# Patient Record
Sex: FEMALE | Race: WHITE | NOT HISPANIC OR LATINO | Employment: STUDENT | URBAN - METROPOLITAN AREA
[De-identification: names, ages, dates, MRNs, and addresses within clinical notes are randomized per-mention and may not be internally consistent; named-entity substitution may affect disease eponyms.]

---

## 2021-10-15 ENCOUNTER — APPOINTMENT (OUTPATIENT)
Dept: LAB | Facility: MEDICAL CENTER | Age: 21
End: 2021-10-15
Payer: COMMERCIAL

## 2021-10-15 DIAGNOSIS — E55.9 VITAMIN D DEFICIENCY: ICD-10-CM

## 2021-10-15 DIAGNOSIS — Z79.899 ENCOUNTER FOR LONG-TERM (CURRENT) USE OF OTHER MEDICATIONS: ICD-10-CM

## 2021-10-15 DIAGNOSIS — M08.80 JIA (JUVENILE IDIOPATHIC ARTHRITIS) (HCC): ICD-10-CM

## 2021-10-15 LAB
25(OH)D3 SERPL-MCNC: 11.8 NG/ML (ref 30–100)
ALBUMIN SERPL BCP-MCNC: 3.6 G/DL (ref 3.5–5)
ALP SERPL-CCNC: 58 U/L (ref 46–116)
ALT SERPL W P-5'-P-CCNC: 19 U/L (ref 12–78)
ANION GAP SERPL CALCULATED.3IONS-SCNC: 2 MMOL/L (ref 4–13)
AST SERPL W P-5'-P-CCNC: 11 U/L (ref 5–45)
BASOPHILS # BLD AUTO: 0.07 THOUSANDS/ΜL (ref 0–0.1)
BASOPHILS NFR BLD AUTO: 1 % (ref 0–1)
BILIRUB SERPL-MCNC: 0.99 MG/DL (ref 0.2–1)
BUN SERPL-MCNC: 15 MG/DL (ref 5–25)
CALCIUM SERPL-MCNC: 9.8 MG/DL (ref 8.3–10.1)
CHLORIDE SERPL-SCNC: 108 MMOL/L (ref 100–108)
CO2 SERPL-SCNC: 27 MMOL/L (ref 21–32)
CREAT SERPL-MCNC: 0.82 MG/DL (ref 0.6–1.3)
CRP SERPL QL: <3 MG/L
EOSINOPHIL # BLD AUTO: 0.18 THOUSAND/ΜL (ref 0–0.61)
EOSINOPHIL NFR BLD AUTO: 2 % (ref 0–6)
ERYTHROCYTE [DISTWIDTH] IN BLOOD BY AUTOMATED COUNT: 12 % (ref 11.6–15.1)
ERYTHROCYTE [SEDIMENTATION RATE] IN BLOOD: 26 MM/HOUR (ref 0–19)
GFR SERPL CREATININE-BSD FRML MDRD: 103 ML/MIN/1.73SQ M
GLUCOSE SERPL-MCNC: 89 MG/DL (ref 65–140)
HCT VFR BLD AUTO: 39.2 % (ref 34.8–46.1)
HGB BLD-MCNC: 12.4 G/DL (ref 11.5–15.4)
IMM GRANULOCYTES # BLD AUTO: 0.02 THOUSAND/UL (ref 0–0.2)
IMM GRANULOCYTES NFR BLD AUTO: 0 % (ref 0–2)
LYMPHOCYTES # BLD AUTO: 2.08 THOUSANDS/ΜL (ref 0.6–4.47)
LYMPHOCYTES NFR BLD AUTO: 22 % (ref 14–44)
MCH RBC QN AUTO: 29.1 PG (ref 26.8–34.3)
MCHC RBC AUTO-ENTMCNC: 31.6 G/DL (ref 31.4–37.4)
MCV RBC AUTO: 92 FL (ref 82–98)
MONOCYTES # BLD AUTO: 0.52 THOUSAND/ΜL (ref 0.17–1.22)
MONOCYTES NFR BLD AUTO: 5 % (ref 4–12)
NEUTROPHILS # BLD AUTO: 6.69 THOUSANDS/ΜL (ref 1.85–7.62)
NEUTS SEG NFR BLD AUTO: 70 % (ref 43–75)
NRBC BLD AUTO-RTO: 0 /100 WBCS
PLATELET # BLD AUTO: 169 THOUSANDS/UL (ref 149–390)
PMV BLD AUTO: 10.9 FL (ref 8.9–12.7)
POTASSIUM SERPL-SCNC: 4.2 MMOL/L (ref 3.5–5.3)
PROT SERPL-MCNC: 8.1 G/DL (ref 6.4–8.2)
RBC # BLD AUTO: 4.26 MILLION/UL (ref 3.81–5.12)
SODIUM SERPL-SCNC: 137 MMOL/L (ref 136–145)
WBC # BLD AUTO: 9.56 THOUSAND/UL (ref 4.31–10.16)

## 2021-10-15 PROCEDURE — 85025 COMPLETE CBC W/AUTO DIFF WBC: CPT

## 2021-10-15 PROCEDURE — 86200 CCP ANTIBODY: CPT

## 2021-10-15 PROCEDURE — 85652 RBC SED RATE AUTOMATED: CPT

## 2021-10-15 PROCEDURE — 36415 COLL VENOUS BLD VENIPUNCTURE: CPT

## 2021-10-15 PROCEDURE — 86430 RHEUMATOID FACTOR TEST QUAL: CPT

## 2021-10-15 PROCEDURE — 86140 C-REACTIVE PROTEIN: CPT

## 2021-10-15 PROCEDURE — 82306 VITAMIN D 25 HYDROXY: CPT

## 2021-10-15 PROCEDURE — 80053 COMPREHEN METABOLIC PANEL: CPT

## 2021-10-18 LAB — RHEUMATOID FACT SER QL LA: NEGATIVE

## 2021-10-19 LAB — CCP AB SER IA-ACNC: 2.3

## 2022-08-19 ENCOUNTER — OFFICE VISIT (OUTPATIENT)
Dept: URGENT CARE | Facility: MEDICAL CENTER | Age: 22
End: 2022-08-19
Payer: COMMERCIAL

## 2022-08-19 ENCOUNTER — APPOINTMENT (OUTPATIENT)
Dept: LAB | Facility: MEDICAL CENTER | Age: 22
End: 2022-08-19
Payer: COMMERCIAL

## 2022-08-19 VITALS
SYSTOLIC BLOOD PRESSURE: 107 MMHG | WEIGHT: 134.7 LBS | DIASTOLIC BLOOD PRESSURE: 76 MMHG | RESPIRATION RATE: 18 BRPM | OXYGEN SATURATION: 98 % | HEART RATE: 88 BPM | TEMPERATURE: 98.1 F

## 2022-08-19 DIAGNOSIS — M08.80 JIA (JUVENILE IDIOPATHIC ARTHRITIS) (HCC): ICD-10-CM

## 2022-08-19 DIAGNOSIS — H61.21 IMPACTED CERUMEN OF RIGHT EAR: Primary | ICD-10-CM

## 2022-08-19 DIAGNOSIS — E55.9 AVITAMINOSIS D: ICD-10-CM

## 2022-08-19 DIAGNOSIS — Z79.899 ENCOUNTER FOR LONG-TERM (CURRENT) USE OF OTHER MEDICATIONS: ICD-10-CM

## 2022-08-19 LAB
25(OH)D3 SERPL-MCNC: 44.8 NG/ML (ref 30–100)
ALBUMIN SERPL BCP-MCNC: 3.3 G/DL (ref 3.5–5)
ALP SERPL-CCNC: 56 U/L (ref 46–116)
ALT SERPL W P-5'-P-CCNC: 26 U/L (ref 12–78)
ANION GAP SERPL CALCULATED.3IONS-SCNC: 4 MMOL/L (ref 4–13)
AST SERPL W P-5'-P-CCNC: 24 U/L (ref 5–45)
BASOPHILS # BLD AUTO: 0.05 THOUSANDS/ΜL (ref 0–0.1)
BASOPHILS NFR BLD AUTO: 1 % (ref 0–1)
BILIRUB SERPL-MCNC: 0.72 MG/DL (ref 0.2–1)
BUN SERPL-MCNC: 14 MG/DL (ref 5–25)
CALCIUM ALBUM COR SERPL-MCNC: 10.2 MG/DL (ref 8.3–10.1)
CALCIUM SERPL-MCNC: 9.6 MG/DL (ref 8.3–10.1)
CHLORIDE SERPL-SCNC: 108 MMOL/L (ref 96–108)
CO2 SERPL-SCNC: 27 MMOL/L (ref 21–32)
CREAT SERPL-MCNC: 0.78 MG/DL (ref 0.6–1.3)
CRP SERPL QL: 4.8 MG/L
EOSINOPHIL # BLD AUTO: 0.29 THOUSAND/ΜL (ref 0–0.61)
EOSINOPHIL NFR BLD AUTO: 6 % (ref 0–6)
ERYTHROCYTE [DISTWIDTH] IN BLOOD BY AUTOMATED COUNT: 12.4 % (ref 11.6–15.1)
ERYTHROCYTE [SEDIMENTATION RATE] IN BLOOD: 21 MM/HOUR (ref 0–19)
GFR SERPL CREATININE-BSD FRML MDRD: 109 ML/MIN/1.73SQ M
GLUCOSE SERPL-MCNC: 100 MG/DL (ref 65–140)
HCT VFR BLD AUTO: 40.8 % (ref 34.8–46.1)
HGB BLD-MCNC: 13.5 G/DL (ref 11.5–15.4)
IMM GRANULOCYTES # BLD AUTO: 0.01 THOUSAND/UL (ref 0–0.2)
IMM GRANULOCYTES NFR BLD AUTO: 0 % (ref 0–2)
LYMPHOCYTES # BLD AUTO: 1.78 THOUSANDS/ΜL (ref 0.6–4.47)
LYMPHOCYTES NFR BLD AUTO: 36 % (ref 14–44)
MCH RBC QN AUTO: 30.3 PG (ref 26.8–34.3)
MCHC RBC AUTO-ENTMCNC: 33.1 G/DL (ref 31.4–37.4)
MCV RBC AUTO: 92 FL (ref 82–98)
MONOCYTES # BLD AUTO: 0.39 THOUSAND/ΜL (ref 0.17–1.22)
MONOCYTES NFR BLD AUTO: 8 % (ref 4–12)
NEUTROPHILS # BLD AUTO: 2.5 THOUSANDS/ΜL (ref 1.85–7.62)
NEUTS SEG NFR BLD AUTO: 49 % (ref 43–75)
NRBC BLD AUTO-RTO: 0 /100 WBCS
PLATELET # BLD AUTO: 304 THOUSANDS/UL (ref 149–390)
PMV BLD AUTO: 10.6 FL (ref 8.9–12.7)
POTASSIUM SERPL-SCNC: 4.3 MMOL/L (ref 3.5–5.3)
PROT SERPL-MCNC: 7.7 G/DL (ref 6.4–8.4)
RBC # BLD AUTO: 4.45 MILLION/UL (ref 3.81–5.12)
SODIUM SERPL-SCNC: 139 MMOL/L (ref 135–147)
WBC # BLD AUTO: 5.02 THOUSAND/UL (ref 4.31–10.16)

## 2022-08-19 PROCEDURE — 80053 COMPREHEN METABOLIC PANEL: CPT

## 2022-08-19 PROCEDURE — 85025 COMPLETE CBC W/AUTO DIFF WBC: CPT

## 2022-08-19 PROCEDURE — 82306 VITAMIN D 25 HYDROXY: CPT

## 2022-08-19 PROCEDURE — 99213 OFFICE O/P EST LOW 20 MIN: CPT | Performed by: EMERGENCY MEDICINE

## 2022-08-19 PROCEDURE — 86140 C-REACTIVE PROTEIN: CPT

## 2022-08-19 PROCEDURE — 36415 COLL VENOUS BLD VENIPUNCTURE: CPT

## 2022-08-19 PROCEDURE — 85652 RBC SED RATE AUTOMATED: CPT

## 2022-08-19 RX ORDER — ETANERCEPT 50 MG/ML
SOLUTION SUBCUTANEOUS
COMMUNITY
Start: 2022-07-11

## 2022-08-19 RX ORDER — NORGESTIMATE AND ETHINYL ESTRADIOL 0.25-0.035
1 KIT ORAL DAILY
COMMUNITY
Start: 2022-08-10

## 2022-08-19 RX ORDER — ERGOCALCIFEROL 1.25 MG/1
CAPSULE ORAL
COMMUNITY
Start: 2022-08-04

## 2022-08-19 NOTE — PATIENT INSTRUCTIONS
May also be called: Ear Impaction; Ear Blockage; Earwax Blockage; Impacted Earwax; Cerumen Inspissatum  Impacted cerumen (se-CARINA-men) is when earwax (cerumen) builds up in the ear and blocks the ear canal; it can cause temporary hearing loss and ear pain  More to Know  Earwax is produced by glands in the ear canal, which leads from the outer ear to the eardrum  Earwax helps protect the ear by trapping dust and other foreign particles that could damage the ear  Normally, earwax moves toward the opening of the ear and falls out or is washed away, but some people's ears produce too much wax  The extra wax can build up and harden in the ear canal and become difficult to remove  Earwax also can become impacted when, during ear cleaning, the wax is accidentally pushed deeper into the ear canal   Impacted cerumen can cause earaches, temporary hearing loss, ringing in the ear (tinnitus), coughing, or a feeling that the ear is full or plugged  It also can increase the risk of an outer ear infection  Impacted cerumen should be removed by a doctor to help avoid damaging the ear  Treatment options include removing the earwax with instruments or by flushing the ear canal with special liquids  Keep in Mind  Impacted cerumen may improve on its own, but treatment by a doctor is generally safe and effective  Hearing usually returns completely after the impacted earwax is removed  Trying to remove impacted cerumen at home with a cotton swab or by ear candling is not recommended    Carbamide Peroxide (Into the ear)     Carbamide Peroxide (DOMINIC-kathy-mide per-OX-tennille)    Used to soften, loosen, and remove excess wax from your ears           Brand Name(s): Audiologist's Choice, Auro Ear Drops, Clearcanal Earwax Softener Drops, Debrox, E-R-O Ear Drops, Ear Drops, Ear Wax Removal, Ear Wax Removal System, Earwax Removal Kit, Earwax Treatment, [facility] Earwax Removal, Good Sense Ear Wax Removal, Good Sense Ear Wax Removal Kit, Leader Earwax Removal Drops, Baljit's ProRinse Earwax Removal Kit   There may be other brand names for this medicine  When This Medicine Should Not Be Used: You should not use this medicine if you have had an allergic reaction to carbamide peroxide  You should not use this medicine if you have a punctured eardrum, or discharge from your ear  You should not use this medicine if you have had an ear surgery, or if you have pain, irritation, or rash in your ear  How to Use This Medicine:   Liquid, Drop  Instill 5-10 drops in each ear  Lie on the opposite side to allow the medicine to sit in the affected ear and soften the ear wax  After approximately 15 minutes, tilt your head to let the fluid drain out of your ear  You may want to hold a tissue there to catch the fluid  You may also see chunks of wax in the fluid  This is normal     Follow the instructions on the medicine label if you are using this medicine without a prescription  Remove your hearing aid while using this medicine  Use this medicine only in your ear  Wash your hands with soap and water before and after using this medicine  You may warm the drops by holding the unopened bottle in your hands for a few minutes  Remove the cap  Do not let the tip of the dropper touch anything, including your ear  Lie down or tilt your head to the side  For a child, gently pull the child's earlobe down and back to straighten the child's ear canal  For an adult, gently pull the earlobe up and back to straighten the ear canal   Drop the prescribed number of drops into the ear  Keep the ear tilted up for a few minutes or put a cotton ball into your ear  You may hear a bubbling noise in your ear after placing the drop in it  This is normal and is not something to worry about  Do not rinse the dropper  After using this medicine 4 days, gently flush your ear with warm water, using a soft bulb syringe to remove the wax  If a dose is missed:    You must use this medicine on a fixed schedule  Call your doctor or pharmacist if you miss a dose  How to Store and Dispose of This Medicine:   Keep the bottle closed when you are not using it  Store it at room temperature, away from light and heat  Do not freeze  Ask your pharmacist, doctor, or health caregiver about the best way to dispose of any outdated medicine or medicine no longer needed  Keep all medicine out of the reach of children  Never share your medicine with anyone  Drugs and Foods to Avoid:   Ask your doctor or pharmacist before using any other medicine, including over-the-counter medicines, vitamins, and herbal products  You should not use other ear medicines while using this medicine, unless your doctor tells you to  Warnings While Using This Medicine:   Avoid getting this medicine in your eyes  If the medicine does get in your eyes, flush them with water and call your doctor right away  This medicine should not be given to children under 15years of age without a doctor's approval   Call your doctor if your symptoms do not improve or if they get worse  Do not use this medicine for longer than 4 days  Possible Side Effects While Using This Medicine:   Call your doctor right away if you notice any of these side effects: Allergic reaction: Itching or hives, swelling in your face or hands, swelling or tingling in your mouth or throat, chest tightness, trouble breathing  If you notice other side effects that you think are caused by this medicine, tell your doctor  Call your doctor for medical advice about side effects   You may report side effects to FDA at 4-308-FDA-1029

## 2022-08-19 NOTE — PROGRESS NOTES
[facility]        NAME: Juliann Santoro is a 25 y o  female  : 2000    MRN: [id number]  DATE: 2022  TIME: 12:55 PM    Assessment and Plan   Impacted cerumen of right ear [H61 21]  1  Impacted cerumen of right ear           Patient Instructions     May also be called: Ear Impaction; Ear Blockage; Earwax Blockage; Impacted Earwax; Cerumen Inspissatum  Impacted cerumen (se-CARINA-men) is when earwax (cerumen) builds up in the ear and blocks the ear canal; it can cause temporary hearing loss and ear pain  More to Know  Earwax is produced by glands in the ear canal, which leads from the outer ear to the eardrum  Earwax helps protect the ear by trapping dust and other foreign particles that could damage the ear  Normally, earwax moves toward the opening of the ear and falls out or is washed away, but some people's ears produce too much wax  The extra wax can build up and harden in the ear canal and become difficult to remove  Earwax also can become impacted when, during ear cleaning, the wax is accidentally pushed deeper into the ear canal   Impacted cerumen can cause earaches, temporary hearing loss, ringing in the ear (tinnitus), coughing, or a feeling that the ear is full or plugged  It also can increase the risk of an outer ear infection  Impacted cerumen should be removed by a doctor to help avoid damaging the ear  Treatment options include removing the earwax with instruments or by flushing the ear canal with special liquids  Keep in Mind  Impacted cerumen may improve on its own, but treatment by a doctor is generally safe and effective  Hearing usually returns completely after the impacted earwax is removed  Trying to remove impacted cerumen at home with a cotton swab or by ear candling is not recommended    Carbamide Peroxide (Into the ear)     Carbamide Peroxide (DOMINIC-ba-mide per-OX-tennille)    Used to soften, loosen, and remove excess wax from your ears           Brand Name(s): Audiologist's Choice, Auro Ear Drops, Clearcanal Earwax Softener Drops, Debrox, E-R-O Ear Drops, Ear Drops, Ear Wax Removal, Ear Wax Removal System, Earwax Removal Kit, Earwax Treatment, [facility] Earwax Removal, Good Sense Ear Wax Removal, Good Sense Ear Wax Removal Kit, Leader Earwax Removal Drops, Baljit's ProRinse Earwax Removal Kit   There may be other brand names for this medicine  When This Medicine Should Not Be Used: You should not use this medicine if you have had an allergic reaction to carbamide peroxide  You should not use this medicine if you have a punctured eardrum, or discharge from your ear  You should not use this medicine if you have had an ear surgery, or if you have pain, irritation, or rash in your ear  How to Use This Medicine:   Liquid, Drop  · Instill 5-10 drops in each ear  Lie on the opposite side to allow the medicine to sit in the affected ear and soften the ear wax  After approximately 15 minutes, tilt your head to let the fluid drain out of your ear  You may want to hold a tissue there to catch the fluid  You may also see chunks of wax in the fluid  This is normal     · Follow the instructions on the medicine label if you are using this medicine without a prescription  · Remove your hearing aid while using this medicine  · Use this medicine only in your ear  · Wash your hands with soap and water before and after using this medicine  · You may warm the drops by holding the unopened bottle in your hands for a few minutes  · Remove the cap  Do not let the tip of the dropper touch anything, including your ear  · Lie down or tilt your head to the side  For a child, gently pull the child's earlobe down and back to straighten the child's ear canal  For an adult, gently pull the earlobe up and back to straighten the ear canal   · Drop the prescribed number of drops into the ear   Keep the ear tilted up for a few minutes or put a cotton ball into your ear   · You may hear a bubbling noise in your ear after placing the drop in it  This is normal and is not something to worry about  · Do not rinse the dropper  · After using this medicine 4 days, gently flush your ear with warm water, using a soft bulb syringe to remove the wax  If a dose is missed:   · You must use this medicine on a fixed schedule  Call your doctor or pharmacist if you miss a dose  How to Store and Dispose of This Medicine:   · Keep the bottle closed when you are not using it  Store it at room temperature, away from light and heat  Do not freeze  · Ask your pharmacist, doctor, or health caregiver about the best way to dispose of any outdated medicine or medicine no longer needed  · Keep all medicine out of the reach of children  Never share your medicine with anyone  Drugs and Foods to Avoid:   Ask your doctor or pharmacist before using any other medicine, including over-the-counter medicines, vitamins, and herbal products  · You should not use other ear medicines while using this medicine, unless your doctor tells you to  Warnings While Using This Medicine:   · Avoid getting this medicine in your eyes  If the medicine does get in your eyes, flush them with water and call your doctor right away  · This medicine should not be given to children under 15years of age without a doctor's approval   · Call your doctor if your symptoms do not improve or if they get worse  · Do not use this medicine for longer than 4 days  Possible Side Effects While Using This Medicine:   Call your doctor right away if you notice any of these side effects:  · Allergic reaction: Itching or hives, swelling in your face or hands, swelling or tingling in your mouth or throat, chest tightness, trouble breathing  If you notice other side effects that you think are caused by this medicine, tell your doctor  Call your doctor for medical advice about side effects   You may report side effects to FDA at 9-362-TSS-9833 Follow up with PCP in 3-5 days  Proceed to  ER if symptoms worsen  Chief Complaint     Chief Complaint   Patient presents with    Ear Fullness     Patient relates started with right ear feeling clogged x 3 - 4 days  Denies fever and cough  No injury to ear  History of Present Illness       24 y/o female presents today for evaluation of feeling like her right ear has been clogged x 3-4 days  She states she has not put any drops in the ear but has tried to remove possible ear wax with cotton swabs  No fever or chills  No post nasal drip or nasal congestion  No sore throat  Has not been swimming recently  Review of Systems   Review of Systems   Constitutional: Negative for chills, fatigue and fever  HENT: Positive for ear pain  Negative for ear discharge, postnasal drip, sore throat and trouble swallowing  Respiratory: Negative for chest tightness and shortness of breath  Gastrointestinal: Negative for abdominal pain  Genitourinary: Negative for dysuria  Skin: Negative for rash  Allergic/Immunologic: Negative for immunocompromised state  Neurological: Negative for dizziness, light-headedness and headaches  Current Medications       Current Outpatient Medications:     Enbrel SureClick 50 MG/ML injection, , Disp: , Rfl:     ergocalciferol (VITAMIN D2) 50,000 units, TAKE 1 CAPSULE BY MOUTH ONE TIME PER WEEK FOR 90 DAYS, Disp: , Rfl:     Saira 0 25-35 MG-MCG per tablet, Take 1 tablet by mouth daily, Disp: , Rfl:     Current Allergies     Allergies as of 08/19/2022    (No Known Allergies)            The following portions of the patient's history were reviewed and updated as appropriate: allergies, current medications, past family history, past medical history, past social history, past surgical history and problem list      History reviewed  No pertinent past medical history  History reviewed  No pertinent surgical history      No family history on file       Medications have been verified  Objective   /76   Pulse 88   Temp 98 1 °F (36 7 °C) (Tympanic)   Resp 18   Wt 61 1 kg (134 lb 11 2 oz)   LMP 08/11/2022   SpO2 98%        Physical Exam     Physical Exam  Vitals and nursing note reviewed  Constitutional:       Appearance: Normal appearance  HENT:      Head: Normocephalic and atraumatic  Right Ear: There is impacted cerumen  Left Ear: Tympanic membrane, ear canal and external ear normal    Skin:     General: Skin is warm and dry  Capillary Refill: Capillary refill takes less than 2 seconds  Neurological:      General: No focal deficit present  Mental Status: She is alert and oriented to person, place, and time     Psychiatric:         Mood and Affect: Mood normal          Behavior: Behavior normal